# Patient Record
Sex: FEMALE
[De-identification: names, ages, dates, MRNs, and addresses within clinical notes are randomized per-mention and may not be internally consistent; named-entity substitution may affect disease eponyms.]

---

## 2022-03-28 ENCOUNTER — APPOINTMENT (OUTPATIENT)
Dept: ORTHOPEDIC SURGERY | Facility: CLINIC | Age: 62
End: 2022-03-28

## 2022-03-30 ENCOUNTER — RESULT REVIEW (OUTPATIENT)
Age: 62
End: 2022-03-30

## 2022-03-30 ENCOUNTER — APPOINTMENT (OUTPATIENT)
Dept: RADIOLOGY | Facility: CLINIC | Age: 62
End: 2022-03-30

## 2022-03-30 ENCOUNTER — OUTPATIENT (OUTPATIENT)
Dept: OUTPATIENT SERVICES | Facility: HOSPITAL | Age: 62
LOS: 1 days | End: 2022-03-30
Payer: COMMERCIAL

## 2022-03-30 ENCOUNTER — TRANSCRIPTION ENCOUNTER (OUTPATIENT)
Age: 62
End: 2022-03-30

## 2022-03-30 ENCOUNTER — APPOINTMENT (OUTPATIENT)
Dept: ORTHOPEDIC SURGERY | Facility: CLINIC | Age: 62
End: 2022-03-30
Payer: COMMERCIAL

## 2022-03-30 VITALS — BODY MASS INDEX: 29.07 KG/M2 | WEIGHT: 154 LBS | HEIGHT: 61 IN | RESPIRATION RATE: 16 BRPM

## 2022-03-30 DIAGNOSIS — F17.200 NICOTINE DEPENDENCE, UNSPECIFIED, UNCOMPLICATED: ICD-10-CM

## 2022-03-30 DIAGNOSIS — Z78.9 OTHER SPECIFIED HEALTH STATUS: ICD-10-CM

## 2022-03-30 PROCEDURE — 73630 X-RAY EXAM OF FOOT: CPT | Mod: 26,LT

## 2022-03-30 PROCEDURE — 73630 X-RAY EXAM OF FOOT: CPT

## 2022-03-30 PROCEDURE — 73610 X-RAY EXAM OF ANKLE: CPT | Mod: 26,LT

## 2022-03-30 PROCEDURE — 73610 X-RAY EXAM OF ANKLE: CPT

## 2022-03-30 PROCEDURE — 99204 OFFICE O/P NEW MOD 45 MIN: CPT

## 2022-03-30 RX ORDER — PREDNISONE 5 MG/1
5 TABLET, DELAYED RELEASE ORAL
Qty: 30 | Refills: 0 | Status: ACTIVE | COMMUNITY
Start: 2022-03-03

## 2022-03-30 RX ORDER — ERGOCALCIFEROL 1.25 MG/1
1.25 MG CAPSULE, LIQUID FILLED ORAL
Qty: 2 | Refills: 0 | Status: ACTIVE | COMMUNITY
Start: 2021-08-25

## 2022-03-30 RX ORDER — CALCIUM CARBONATE 500(1250)
500 TABLET ORAL
Qty: 60 | Refills: 0 | Status: ACTIVE | COMMUNITY
Start: 2021-10-02

## 2022-03-31 ENCOUNTER — TRANSCRIPTION ENCOUNTER (OUTPATIENT)
Age: 62
End: 2022-03-31

## 2022-04-01 ENCOUNTER — NON-APPOINTMENT (OUTPATIENT)
Age: 62
End: 2022-04-01

## 2022-04-05 ENCOUNTER — TRANSCRIPTION ENCOUNTER (OUTPATIENT)
Age: 62
End: 2022-04-05

## 2022-04-20 ENCOUNTER — APPOINTMENT (OUTPATIENT)
Dept: ORTHOPEDIC SURGERY | Facility: CLINIC | Age: 62
End: 2022-04-20
Payer: COMMERCIAL

## 2022-04-20 VITALS — RESPIRATION RATE: 16 BRPM | BODY MASS INDEX: 29.07 KG/M2 | HEIGHT: 61 IN | WEIGHT: 154 LBS

## 2022-04-20 PROCEDURE — 99214 OFFICE O/P EST MOD 30 MIN: CPT

## 2022-04-26 LAB
COTINE: <1 NG/ML
COTININE SERPL-MCNC: <1 NG/ML

## 2022-04-27 RX ORDER — ONDANSETRON 4 MG/1
4 TABLET ORAL EVERY 8 HOURS
Qty: 15 | Refills: 3 | Status: ACTIVE | COMMUNITY
Start: 2022-04-27 | End: 1900-01-01

## 2022-04-28 ENCOUNTER — TRANSCRIPTION ENCOUNTER (OUTPATIENT)
Age: 62
End: 2022-04-28

## 2022-04-29 ENCOUNTER — TRANSCRIPTION ENCOUNTER (OUTPATIENT)
Age: 62
End: 2022-04-29

## 2022-04-30 ENCOUNTER — TRANSCRIPTION ENCOUNTER (OUTPATIENT)
Age: 62
End: 2022-04-30

## 2022-05-02 ENCOUNTER — TRANSCRIPTION ENCOUNTER (OUTPATIENT)
Age: 62
End: 2022-05-02

## 2022-05-02 RX ORDER — OXYCODONE 5 MG/1
5 TABLET ORAL
Qty: 40 | Refills: 0 | Status: ACTIVE | COMMUNITY
Start: 2022-04-27 | End: 1900-01-01

## 2022-05-02 RX ORDER — DOCUSATE SODIUM 100 MG/1
100 CAPSULE ORAL TWICE DAILY
Qty: 28 | Refills: 3 | Status: ACTIVE | COMMUNITY
Start: 2022-04-27 | End: 1900-01-01

## 2022-05-04 ENCOUNTER — TRANSCRIPTION ENCOUNTER (OUTPATIENT)
Age: 62
End: 2022-05-04

## 2022-05-05 ENCOUNTER — TRANSCRIPTION ENCOUNTER (OUTPATIENT)
Age: 62
End: 2022-05-05

## 2022-05-05 ENCOUNTER — APPOINTMENT (OUTPATIENT)
Age: 62
End: 2022-05-05
Payer: COMMERCIAL

## 2022-05-05 PROCEDURE — 28750 FUSION OF BIG TOE JOINT: CPT | Mod: LT

## 2022-05-05 PROCEDURE — 20680 REMOVAL OF IMPLANT DEEP: CPT | Mod: LT

## 2022-05-10 ENCOUNTER — TRANSCRIPTION ENCOUNTER (OUTPATIENT)
Age: 62
End: 2022-05-10

## 2022-05-18 ENCOUNTER — APPOINTMENT (OUTPATIENT)
Dept: ORTHOPEDIC SURGERY | Facility: CLINIC | Age: 62
End: 2022-05-18
Payer: COMMERCIAL

## 2022-05-18 PROCEDURE — 99024 POSTOP FOLLOW-UP VISIT: CPT

## 2022-05-18 PROCEDURE — 73630 X-RAY EXAM OF FOOT: CPT | Mod: 26,LT

## 2022-05-18 PROCEDURE — 29405 APPL SHORT LEG CAST: CPT | Mod: 58,LT

## 2022-05-19 ENCOUNTER — TRANSCRIPTION ENCOUNTER (OUTPATIENT)
Age: 62
End: 2022-05-19

## 2022-05-19 VITALS — WEIGHT: 154 LBS | HEIGHT: 61 IN | RESPIRATION RATE: 16 BRPM | BODY MASS INDEX: 29.07 KG/M2

## 2022-05-23 ENCOUNTER — NON-APPOINTMENT (OUTPATIENT)
Age: 62
End: 2022-05-23

## 2022-05-24 ENCOUNTER — TRANSCRIPTION ENCOUNTER (OUTPATIENT)
Age: 62
End: 2022-05-24

## 2022-05-24 ENCOUNTER — RX CHANGE (OUTPATIENT)
Age: 62
End: 2022-05-24

## 2022-05-24 RX ORDER — ASPIRIN 325 MG/1
325 TABLET, FILM COATED ORAL TWICE DAILY
Qty: 60 | Refills: 0 | Status: ACTIVE | COMMUNITY
Start: 2022-04-27 | End: 1900-01-01

## 2022-06-17 ENCOUNTER — APPOINTMENT (OUTPATIENT)
Dept: ORTHOPEDIC SURGERY | Facility: CLINIC | Age: 62
End: 2022-06-17
Payer: COMMERCIAL

## 2022-06-17 PROCEDURE — 99024 POSTOP FOLLOW-UP VISIT: CPT

## 2022-06-17 PROCEDURE — 73630 X-RAY EXAM OF FOOT: CPT | Mod: LT

## 2022-06-20 ENCOUNTER — RX RENEWAL (OUTPATIENT)
Age: 62
End: 2022-06-20

## 2022-06-22 VITALS — RESPIRATION RATE: 16 BRPM | HEIGHT: 61 IN | BODY MASS INDEX: 29.07 KG/M2 | WEIGHT: 154 LBS

## 2022-06-24 ENCOUNTER — RX RENEWAL (OUTPATIENT)
Age: 62
End: 2022-06-24

## 2022-06-24 ENCOUNTER — NON-APPOINTMENT (OUTPATIENT)
Age: 62
End: 2022-06-24

## 2022-07-14 NOTE — DISCUSSION/SUMMARY
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  The patient has mild discomfort over her hardware however her significant pathology of the neurology is due to her shortening of her first ray with increased burden on her lesser metatarsals leading to metatarsalgia and irritation of previous Rodarte's neuromas.  \par \par The patient was educated that she will be nonweightbearing on the left lower extremity for up to 2 months or longer depending on the quality of her bone\par \par All risks, benefits and alternatives to the proposed surgical procedure left foot hallux metatarsal phalangeal joint distraction arthrodesis with iliac crest allograft, left calcaneal autograft bone harvest, removal of hardware left foot were discussed in great detail with the patient. Risks include but are not limited to pain, bleeding, infection, stiffness, medical complications (including DVT, PE, MI), and risks of anesthesia. The patient expressed understanding and all questions were answered. The patient is electing to proceed, and will have the patient scheduled accordingly.\par \par Preoperative labs to be done by the patient’s PCP to obtain medical clearance include PT/PTT/INR, CBC, CMP, HgbA1C.\par \par 1. Surgical scheduling pending a negative nicotine test, negative COVID test and medical clearance.\par 2. WBAT in supportive shoewear\par 3. Smoking cessation\par \par . \par

## 2022-07-14 NOTE — HISTORY OF PRESENT ILLNESS
[FreeTextEntry1] : 61-year-old female with multiple surgeries on her left foot with iatrogenic shortening of the left hallux metatarsal with retained hardware.  The patient has mild discomfort over her hardware however her significant pathology of the neurology is due to her shortening of her first ray with increased burden on her lesser metatarsals leading to metatarsalgia and irritation of previous Rodarte's neuromas. She presents WB in regular shoes and reports that pain is a 3/10 in severity.

## 2022-07-14 NOTE — PHYSICAL EXAM
[de-identified] : Pleasant 61-year-old female\par No evidence of cigarette smell in the room\par \par Left lower extremity\par Multiple well-healed incisions\par Moderate swelling over the left hallux MTP joint\par Palpable hardware over the left dorsal metatarsal and proximal phalanx\par Positive grind test\par Moderate tenderness to palpation over the sesamoids\par Arc range of motion 10 degrees\par Moderate to severe grind test with pain\par Severe avoidance of the medial column with ambulation\par Moderate swelling about the fourth ray\par Medial deviation of the second and third rays\par Longitudinal deficiency of the first ray\par Vascular: Warm well-perfused foot. Palpable 2+ dorsalis pedis and posterior tibialis artery. + Brisk capillary refill to all 5 digits. 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

## 2022-07-22 ENCOUNTER — TRANSCRIPTION ENCOUNTER (OUTPATIENT)
Age: 62
End: 2022-07-22

## 2022-08-08 ENCOUNTER — TRANSCRIPTION ENCOUNTER (OUTPATIENT)
Age: 62
End: 2022-08-08

## 2022-08-17 ENCOUNTER — APPOINTMENT (OUTPATIENT)
Dept: ORTHOPEDIC SURGERY | Facility: CLINIC | Age: 62
End: 2022-08-17
Payer: COMMERCIAL

## 2022-08-17 VITALS — WEIGHT: 154 LBS | RESPIRATION RATE: 16 BRPM | BODY MASS INDEX: 29.07 KG/M2 | HEIGHT: 61 IN

## 2022-08-17 PROCEDURE — 99213 OFFICE O/P EST LOW 20 MIN: CPT

## 2022-08-25 ENCOUNTER — RX RENEWAL (OUTPATIENT)
Age: 62
End: 2022-08-25

## 2022-08-29 ENCOUNTER — RX RENEWAL (OUTPATIENT)
Age: 62
End: 2022-08-29

## 2022-09-08 ENCOUNTER — NON-APPOINTMENT (OUTPATIENT)
Age: 62
End: 2022-09-08

## 2022-09-09 ENCOUNTER — NON-APPOINTMENT (OUTPATIENT)
Age: 62
End: 2022-09-09

## 2022-09-19 ENCOUNTER — TRANSCRIPTION ENCOUNTER (OUTPATIENT)
Age: 62
End: 2022-09-19

## 2022-09-21 ENCOUNTER — TRANSCRIPTION ENCOUNTER (OUTPATIENT)
Age: 62
End: 2022-09-21

## 2022-09-23 ENCOUNTER — TRANSCRIPTION ENCOUNTER (OUTPATIENT)
Age: 62
End: 2022-09-23

## 2022-09-30 ENCOUNTER — APPOINTMENT (OUTPATIENT)
Dept: ORTHOPEDIC SURGERY | Facility: CLINIC | Age: 62
End: 2022-09-30
Payer: COMMERCIAL

## 2022-09-30 PROCEDURE — 99213 OFFICE O/P EST LOW 20 MIN: CPT

## 2022-09-30 PROCEDURE — 73630 X-RAY EXAM OF FOOT: CPT | Mod: LT

## 2022-09-30 RX ORDER — DICLOFENAC SODIUM 1% 10 MG/G
1 GEL TOPICAL
Qty: 1 | Refills: 5 | Status: ACTIVE | COMMUNITY
Start: 2022-09-30 | End: 1900-01-01

## 2022-09-30 RX ORDER — MELOXICAM 15 MG/1
15 TABLET ORAL
Qty: 30 | Refills: 1 | Status: ACTIVE | COMMUNITY
Start: 2022-09-30 | End: 1900-01-01

## 2022-10-03 RX ORDER — GABAPENTIN 300 MG/1
300 CAPSULE ORAL
Qty: 90 | Refills: 1 | Status: ACTIVE | COMMUNITY
Start: 2022-04-27 | End: 1900-01-01

## 2022-10-26 ENCOUNTER — NON-APPOINTMENT (OUTPATIENT)
Age: 62
End: 2022-10-26

## 2022-10-26 ENCOUNTER — APPOINTMENT (OUTPATIENT)
Dept: ORTHOPEDIC SURGERY | Facility: CLINIC | Age: 62
End: 2022-10-26
Payer: COMMERCIAL
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
## 2022-10-26 PROCEDURE — 73630 X-RAY EXAM OF FOOT: CPT | Mod: LT

## 2022-10-26 PROCEDURE — 99214 OFFICE O/P EST MOD 30 MIN: CPT

## 2022-11-01 ENCOUNTER — APPOINTMENT (OUTPATIENT)
Dept: ORTHOPEDIC SURGERY | Facility: CLINIC | Age: 62
End: 2022-11-01

## 2022-11-01 DIAGNOSIS — M79.672 PAIN IN LEFT FOOT: ICD-10-CM

## 2022-11-01 DIAGNOSIS — M20.22 HALLUX RIGIDUS, LEFT FOOT: ICD-10-CM

## 2022-11-01 DIAGNOSIS — G57.62 LESION OF PLANTAR NERVE, LEFT LOWER LIMB: ICD-10-CM

## 2022-11-01 DIAGNOSIS — M20.21 HALLUX RIGIDUS, RIGHT FOOT: ICD-10-CM

## 2022-11-01 DIAGNOSIS — T84.84XA PAIN DUE TO INTERNAL ORTHOPEDIC PROSTHETIC DEVICES, IMPLANTS AND GRAFTS, INITIAL ENCOUNTER: ICD-10-CM

## 2022-11-01 DIAGNOSIS — G89.29 PAIN IN LEFT FOOT: ICD-10-CM

## 2022-11-01 DIAGNOSIS — M21.6X2 OTHER ACQUIRED DEFORMITIES OF LEFT FOOT: ICD-10-CM

## 2022-11-01 DIAGNOSIS — Z96.9 PRESENCE OF FUNCTIONAL IMPLANT, UNSPECIFIED: ICD-10-CM

## 2022-11-01 DIAGNOSIS — M77.42 METATARSALGIA, LEFT FOOT: ICD-10-CM

## 2022-11-01 DIAGNOSIS — Z00.00 ENCOUNTER FOR GENERAL ADULT MEDICAL EXAMINATION W/OUT ABNORMAL FINDINGS: ICD-10-CM

## 2022-11-01 PROCEDURE — 99443: CPT

## 2022-11-09 PROBLEM — Z00.00 ENCOUNTER FOR PREVENTIVE HEALTH EXAMINATION: Status: ACTIVE | Noted: 2022-03-24

## 2022-11-09 PROBLEM — T84.84XA PAINFUL ORTHOPAEDIC HARDWARE: Status: ACTIVE | Noted: 2022-03-30

## 2022-11-09 PROBLEM — M20.21 ACQUIRED HALLUX RIGIDUS OF RIGHT FOOT: Status: ACTIVE | Noted: 2022-03-30

## 2022-11-09 PROBLEM — M77.42 METATARSALGIA OF LEFT FOOT: Status: ACTIVE | Noted: 2022-09-30

## 2022-11-09 PROBLEM — G57.62 MORTON'S NEUROMA OF THIRD INTERSPACE OF LEFT FOOT: Status: ACTIVE | Noted: 2022-09-30

## 2022-11-09 PROBLEM — M21.6X2: Status: ACTIVE | Noted: 2022-04-26
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
## 2022-11-09 PROBLEM — M20.22 ACQUIRED HALLUX RIGIDUS OF LEFT FOOT: Status: ACTIVE | Noted: 2022-04-01

## 2022-11-09 PROBLEM — Z96.9 RETAINED ORTHOPEDIC HARDWARE: Status: ACTIVE | Noted: 2022-03-30

## 2022-11-09 PROBLEM — M79.672 CHRONIC PAIN IN LEFT FOOT: Status: ACTIVE | Noted: 2022-03-30

## 2022-11-09 RX ORDER — CHOLECALCIFEROL (VITAMIN D3) 1250 MCG
1.25 MG CAPSULE ORAL
Qty: 8 | Refills: 0 | Status: ACTIVE | COMMUNITY
Start: 2022-04-27 | End: 1900-01-01

## 2022-11-23 ENCOUNTER — TRANSCRIPTION ENCOUNTER (OUTPATIENT)
Age: 62
End: 2022-11-23

## 2022-11-28 ENCOUNTER — RX RENEWAL (OUTPATIENT)
Age: 62
End: 2022-11-28

## 2022-11-28 ENCOUNTER — TRANSCRIPTION ENCOUNTER (OUTPATIENT)
Age: 62
End: 2022-11-28

## 2022-12-21 ENCOUNTER — APPOINTMENT (OUTPATIENT)
Dept: RHEUMATOLOGY | Facility: CLINIC | Age: 62
End: 2022-12-21

## 2023-02-03 ENCOUNTER — RX RENEWAL (OUTPATIENT)
Age: 63
End: 2023-02-03

## 2024-09-06 ENCOUNTER — APPOINTMENT (OUTPATIENT)
Dept: PULMONOLOGY | Facility: CLINIC | Age: 64
End: 2024-09-06
Payer: COMMERCIAL

## 2024-09-06 VITALS
BODY MASS INDEX: 29.83 KG/M2 | HEIGHT: 61 IN | OXYGEN SATURATION: 98 % | SYSTOLIC BLOOD PRESSURE: 127 MMHG | DIASTOLIC BLOOD PRESSURE: 83 MMHG | WEIGHT: 158 LBS | RESPIRATION RATE: 16 BRPM | HEART RATE: 95 BPM | TEMPERATURE: 98.6 F

## 2024-09-06 DIAGNOSIS — J18.9 PNEUMONIA, UNSPECIFIED ORGANISM: ICD-10-CM

## 2024-09-06 DIAGNOSIS — R91.1 SOLITARY PULMONARY NODULE: ICD-10-CM

## 2024-09-06 PROCEDURE — 99204 OFFICE O/P NEW MOD 45 MIN: CPT

## 2024-09-06 NOTE — HISTORY OF PRESENT ILLNESS
[>= 20 pack years] : >= 20 pack years [Never] : never [TextBox_4] : 64 year old patient presents for evaluation of  recent pneumonia   She developed pneumonia in 07/2024.  She received oral antibiotics  with improvement.  She had CXR at her PMD office, [facility] that demonstrated lingular opacity that reportedly improved  She had been a smoker and the patient was also exposed to second hand smoke  She has residual dyspnea on exertion  No cough, no sputum     She has no prior  history of pneumonia   Primary doctor is Dr Soha Andersen, fax [phone]     PSH:  foot surgery breast biopsy benign     PMH:  UTI, treated with nitrofurantoin       SH:     former smoker     ETOH:  occasional     Occupation:     No exposure to chemicals, dust, asbestos, mold        ALLERGY:   NKDA     environmental/seasonal allergy:  none       Review of Systems:    no sinusitis, sinus infections, nasal obstruction no dysphagia no dry mouth   no arthritis no joint aches no joint swelling    no obstructive airway disease  no pneumonia no wheeze no lung cancer   no CAD no MI no chest pain no murmur no CHF no HTN no edema   no peptic ulcer or gastritis no GERD no abdominal pain no liver disease    no thyroid disease      no bleeding   no DVT or PE   no kidney disease   no stroke no seizure                         [TextBox_11] : 1 [TextBox_13] : 30 [YearQuit] : 2022

## 2024-09-06 NOTE — DISCUSSION/SUMMARY
[FreeTextEntry1] : 64 year old woman who has been a smoker, > 20 pack years, stopped 2 years ago  She had a recent  pneumonia and has an opacity of the left lung  It appeared to improve but did not resolve  Rule out lung nodule She had FH lung cancer, her mother smoked  PLAN  Obtain NC CT chest  If no nodule then will be followed with annual Low Dose Screening CT chest   If lung nodule present will evaluate and manage as appropriate  Plan for PFT when available   Will plan for PFT  when available.  Not available in this office at this time.    Total time of the encounter: 45 minutes which included but was not limited to the following: Face-to-face and non face-to-face time personally spent by the physician preparing to see the patient, obtaining and/or resuming separately obtained history, performing a medically appropriate examination and/or evaluation, counseling and educating the patient/family/caregiver, ordering medications, tests or procedures, referring and communicating with other healthcare professionals, documenting clinical information in the electronic health record, independently interpreting results and communicated results to the patient/family/caregiver and care coordination.    Matt Cristina MD

## 2024-10-11 ENCOUNTER — APPOINTMENT (OUTPATIENT)
Dept: PULMONOLOGY | Facility: CLINIC | Age: 64
End: 2024-10-11
Payer: COMMERCIAL

## 2024-10-11 VITALS
SYSTOLIC BLOOD PRESSURE: 124 MMHG | HEART RATE: 88 BPM | WEIGHT: 162 LBS | DIASTOLIC BLOOD PRESSURE: 82 MMHG | RESPIRATION RATE: 16 BRPM | HEIGHT: 61 IN | OXYGEN SATURATION: 97 % | BODY MASS INDEX: 30.58 KG/M2 | TEMPERATURE: 98.1 F

## 2024-10-11 DIAGNOSIS — R91.8 OTHER NONSPECIFIC ABNORMAL FINDING OF LUNG FIELD: ICD-10-CM

## 2024-10-11 DIAGNOSIS — J18.9 PNEUMONIA, UNSPECIFIED ORGANISM: ICD-10-CM

## 2024-10-11 DIAGNOSIS — J45.909 UNSPECIFIED ASTHMA, UNCOMPLICATED: ICD-10-CM

## 2024-10-11 DIAGNOSIS — R91.1 SOLITARY PULMONARY NODULE: ICD-10-CM

## 2024-10-11 PROCEDURE — 94010 BREATHING CAPACITY TEST: CPT

## 2024-10-11 PROCEDURE — 99214 OFFICE O/P EST MOD 30 MIN: CPT | Mod: 25

## 2024-10-11 RX ORDER — ALBUTEROL SULFATE 90 UG/1
108 (90 BASE) INHALANT RESPIRATORY (INHALATION)
Qty: 1 | Refills: 5 | Status: ACTIVE | COMMUNITY
Start: 2024-10-11 | End: 1900-01-01

## 2025-01-23 ENCOUNTER — NON-APPOINTMENT (OUTPATIENT)
Age: 65
End: 2025-01-23

## 2025-04-23 ENCOUNTER — NON-APPOINTMENT (OUTPATIENT)
Age: 65
End: 2025-04-23

## 2025-04-25 ENCOUNTER — APPOINTMENT (OUTPATIENT)
Dept: PULMONOLOGY | Facility: CLINIC | Age: 65
End: 2025-04-25
Payer: COMMERCIAL

## 2025-04-25 VITALS
WEIGHT: 164 LBS | TEMPERATURE: 98.2 F | DIASTOLIC BLOOD PRESSURE: 82 MMHG | SYSTOLIC BLOOD PRESSURE: 129 MMHG | OXYGEN SATURATION: 97 % | HEIGHT: 61 IN | HEART RATE: 70 BPM | BODY MASS INDEX: 30.96 KG/M2

## 2025-04-25 DIAGNOSIS — J45.909 UNSPECIFIED ASTHMA, UNCOMPLICATED: ICD-10-CM

## 2025-04-25 DIAGNOSIS — R91.1 SOLITARY PULMONARY NODULE: ICD-10-CM

## 2025-04-25 PROCEDURE — 94729 DIFFUSING CAPACITY: CPT

## 2025-04-25 PROCEDURE — 94060 EVALUATION OF WHEEZING: CPT

## 2025-04-25 PROCEDURE — 99214 OFFICE O/P EST MOD 30 MIN: CPT | Mod: 25

## 2025-04-25 PROCEDURE — 94727 GAS DIL/WSHOT DETER LNG VOL: CPT

## 2025-04-28 RX ORDER — UMECLIDINIUM BROMIDE AND VILANTEROL TRIFENATATE 62.5; 25 UG/1; UG/1
62.5-25 POWDER RESPIRATORY (INHALATION)
Qty: 1 | Refills: 2 | Status: ACTIVE | COMMUNITY
Start: 2025-04-25 | End: 1900-01-01

## 2025-06-13 ENCOUNTER — APPOINTMENT (OUTPATIENT)
Dept: PULMONOLOGY | Facility: CLINIC | Age: 65
End: 2025-06-13